# Patient Record
Sex: FEMALE | Race: WHITE | NOT HISPANIC OR LATINO | URBAN - METROPOLITAN AREA
[De-identification: names, ages, dates, MRNs, and addresses within clinical notes are randomized per-mention and may not be internally consistent; named-entity substitution may affect disease eponyms.]

---

## 2021-03-17 ENCOUNTER — OFFICE VISIT (OUTPATIENT)
Dept: URBAN - METROPOLITAN AREA CLINIC 40 | Facility: CLINIC | Age: 31
End: 2021-03-17

## 2021-03-18 ENCOUNTER — WEB ENCOUNTER (OUTPATIENT)
Dept: URBAN - METROPOLITAN AREA CLINIC 40 | Facility: CLINIC | Age: 31
End: 2021-03-18

## 2021-03-18 ENCOUNTER — OFFICE VISIT (OUTPATIENT)
Dept: URBAN - METROPOLITAN AREA CLINIC 40 | Facility: CLINIC | Age: 31
End: 2021-03-18
Payer: COMMERCIAL

## 2021-03-18 DIAGNOSIS — K52.9 CHRONIC DIARRHEA: ICD-10-CM

## 2021-03-18 DIAGNOSIS — R10.9 ABDOMINAL PAIN: ICD-10-CM

## 2021-03-18 DIAGNOSIS — D80.2 IGA DEFICIENCY: ICD-10-CM

## 2021-03-18 DIAGNOSIS — K62.5 RECTAL BLEEDING: ICD-10-CM

## 2021-03-18 PROCEDURE — 99204 OFFICE O/P NEW MOD 45 MIN: CPT | Performed by: PHYSICIAN ASSISTANT

## 2021-03-18 RX ORDER — SUMATRIPTAN SUCCINATE 100 MG/1
(PRIOR AUTH#:000002328174) TABLET ORAL
Qty: 9 DELAYED RELEASE TABLET | Status: ACTIVE | COMMUNITY

## 2021-03-18 RX ORDER — DICYCLOMINE HYDROCHLORIDE 10 MG/1
1 TABLET CAPSULE ORAL
Qty: 90 | Refills: 1 | OUTPATIENT
Start: 2021-03-18 | End: 2021-05-17

## 2021-03-18 RX ORDER — TOPIRAMATE 50 MG/1
(PRIOR AUTH#:000002328175) TABLET ORAL
Qty: 90 DELAYED RELEASE TABLET | Status: ACTIVE | COMMUNITY

## 2021-03-18 RX ORDER — SODIUM, POTASSIUM,MAG SULFATES 17.5-3.13G
ONCE SOLUTION, RECONSTITUTED, ORAL ORAL
Qty: 1 KIT | Refills: 0 | OUTPATIENT
Start: 2021-03-18 | End: 2021-03-19

## 2021-03-18 RX ORDER — UBROGEPANT 50 MG/1
(PRIOR AUTH#:000000178132) TABLET ORAL
Qty: 10 DELAYED RELEASE TABLET | Status: ACTIVE | COMMUNITY

## 2021-03-18 NOTE — HPI-TODAY'S VISIT:
Ms. Oreilly is a 31 year old White female who presents to the office today for second opinion of previously diagnosed IBS diarrhea.  Has a history of anxiety depression and migraine headaches.  Followed by neurology.  On Ubrelvy, sumatriptan.  Admits to taking Goody's powders at times for her headaches.  Has noted intermittent abdominal cramping, general abdominal pain and loose stool 2-3 times a day for years.  Recently seen by [facility] [place] and did have blood work last year, including normal thyroid function panel, CBC and CMP.  CRP normal.  Celiac serology was normal however total IgA was decreased.  Gives no family history of colon, gastric malignancy.  Does not report a family history of celiac disease.  Denies any significant weight loss.  Has Mirena IUD, denies pregnancy. Has significant stress with her job and is actually planning to quit this position in the coming weeks.  Tends to notice that the loose stool urgency of bowel movement is greatest in the morning before work.  Rare episodes of rectal bleeding of bright red blood.  Denies melena.  Trying to eat healthier and drink more water she admits.  Non-smoker.  Does not drink alcohol.  Has had no response in the past a low FODMAP diet.  Has tried to avoid dairy which also did not seem to make any significant difference in symptoms.  Fairly balanced diet.  She is overweight.  Not diabetic.  No prior endoscopy.

## 2021-03-19 ENCOUNTER — WEB ENCOUNTER (OUTPATIENT)
Dept: URBAN - METROPOLITAN AREA CLINIC 40 | Facility: CLINIC | Age: 31
End: 2021-03-19

## 2021-04-15 ENCOUNTER — ERX REFILL RESPONSE (OUTPATIENT)
Dept: URBAN - METROPOLITAN AREA CLINIC 40 | Facility: CLINIC | Age: 31
End: 2021-04-15

## 2021-04-15 RX ORDER — DICYCLOMINE HYDROCHLORIDE 10 MG/1
1 TABLET CAPSULE ORAL
Qty: 90 | Refills: 1

## 2021-04-21 ENCOUNTER — OFFICE VISIT (OUTPATIENT)
Dept: URBAN - METROPOLITAN AREA SURGERY CENTER 30 | Facility: SURGERY CENTER | Age: 31
End: 2021-04-21
Payer: COMMERCIAL

## 2021-04-21 DIAGNOSIS — K25.9 ANTRAL ULCER: ICD-10-CM

## 2021-04-21 DIAGNOSIS — K29.30 CHRONIC SUPERFICIAL GASTRITIS: ICD-10-CM

## 2021-04-21 DIAGNOSIS — R19.7 ACUTE DIARRHEA: ICD-10-CM

## 2021-04-21 DIAGNOSIS — K29.80 ACUTE DUODENITIS: ICD-10-CM

## 2021-04-21 DIAGNOSIS — K22.8 COLUMNAR-LINED ESOPHAGUS: ICD-10-CM

## 2021-04-21 PROCEDURE — G8907 PT DOC NO EVENTS ON DISCHARG: HCPCS | Performed by: INTERNAL MEDICINE

## 2021-04-21 PROCEDURE — 43239 EGD BIOPSY SINGLE/MULTIPLE: CPT | Performed by: INTERNAL MEDICINE

## 2021-04-21 PROCEDURE — 45380 COLONOSCOPY AND BIOPSY: CPT | Performed by: INTERNAL MEDICINE

## 2021-05-07 ENCOUNTER — OFFICE VISIT (OUTPATIENT)
Dept: URBAN - METROPOLITAN AREA CLINIC 40 | Facility: CLINIC | Age: 31
End: 2021-05-07

## 2021-05-07 RX ORDER — SUMATRIPTAN SUCCINATE 100 MG/1
(PRIOR AUTH#:000002328174) TABLET ORAL
Qty: 9 DELAYED RELEASE TABLET | Status: ACTIVE | COMMUNITY

## 2021-05-07 RX ORDER — TOPIRAMATE 50 MG/1
(PRIOR AUTH#:000002328175) TABLET ORAL
Qty: 90 DELAYED RELEASE TABLET | Status: ACTIVE | COMMUNITY

## 2021-05-07 RX ORDER — DICYCLOMINE HYDROCHLORIDE 10 MG/1
1 TABLET CAPSULE ORAL
Qty: 90 | Refills: 1 | Status: ACTIVE | COMMUNITY

## 2021-05-07 RX ORDER — UBROGEPANT 50 MG/1
(PRIOR AUTH#:000000178132) TABLET ORAL
Qty: 10 DELAYED RELEASE TABLET | Status: ACTIVE | COMMUNITY

## 2021-05-12 ENCOUNTER — OFFICE VISIT (OUTPATIENT)
Dept: URBAN - METROPOLITAN AREA CLINIC 40 | Facility: CLINIC | Age: 31
End: 2021-05-12

## 2021-05-26 ENCOUNTER — OFFICE VISIT (OUTPATIENT)
Dept: URBAN - METROPOLITAN AREA CLINIC 40 | Facility: CLINIC | Age: 31
End: 2021-05-26
Payer: COMMERCIAL

## 2021-05-26 VITALS
HEART RATE: 89 BPM | DIASTOLIC BLOOD PRESSURE: 80 MMHG | SYSTOLIC BLOOD PRESSURE: 120 MMHG | WEIGHT: 182 LBS | TEMPERATURE: 98.6 F | HEIGHT: 67 IN | BODY MASS INDEX: 28.56 KG/M2

## 2021-05-26 DIAGNOSIS — K62.5 RECTAL BLEEDING: ICD-10-CM

## 2021-05-26 DIAGNOSIS — K26.9 DUODENAL ULCER: ICD-10-CM

## 2021-05-26 DIAGNOSIS — K52.9 CHRONIC DIARRHEA: ICD-10-CM

## 2021-05-26 DIAGNOSIS — K29.90 GASTRITIS AND DUODENITIS: ICD-10-CM

## 2021-05-26 DIAGNOSIS — K25.3 GASTRIC ULCER: ICD-10-CM

## 2021-05-26 DIAGNOSIS — K21.9 GERD: ICD-10-CM

## 2021-05-26 DIAGNOSIS — D80.2 IGA DEFICIENCY: ICD-10-CM

## 2021-05-26 DIAGNOSIS — K64.8 INTERNAL HEMORRHOIDS: ICD-10-CM

## 2021-05-26 DIAGNOSIS — R10.9 ABDOMINAL PAIN: ICD-10-CM

## 2021-05-26 PROCEDURE — 99213 OFFICE O/P EST LOW 20 MIN: CPT | Performed by: INTERNAL MEDICINE

## 2021-05-26 RX ORDER — UBROGEPANT 50 MG/1
(PRIOR AUTH#:000000178132) TABLET ORAL
Qty: 10 DELAYED RELEASE TABLET | Status: ACTIVE | COMMUNITY

## 2021-05-26 RX ORDER — PANTOPRAZOLE SODIUM 40 MG/1
1 TABLET TABLET, DELAYED RELEASE ORAL BID
Qty: 60 TABLET | Refills: 1 | OUTPATIENT
Start: 2021-05-26

## 2021-05-26 RX ORDER — DICYCLOMINE HYDROCHLORIDE 10 MG/1
1 TABLET CAPSULE ORAL
Qty: 90 | Refills: 1 | Status: ACTIVE | COMMUNITY

## 2021-05-26 RX ORDER — SUMATRIPTAN SUCCINATE 100 MG/1
(PRIOR AUTH#:000002328174) TABLET ORAL
Qty: 9 DELAYED RELEASE TABLET | Status: ACTIVE | COMMUNITY

## 2021-05-26 RX ORDER — TOPIRAMATE 50 MG/1
(PRIOR AUTH#:000002328175) TABLET ORAL
Qty: 90 DELAYED RELEASE TABLET | Status: ACTIVE | COMMUNITY

## 2021-05-26 RX ORDER — SUCRALFATE 1 G/1
1 TABLET ON AN EMPTY STOMACH TABLET ORAL TWICE A DAY
Qty: 60 | Refills: 2 | OUTPATIENT
Start: 2021-05-26 | End: 2021-08-24

## 2021-05-26 NOTE — HPI-TODAY'S VISIT:
Ms. Oreilly is a 31 year old White female last seen 3/18/21 for second opinion of prior diagnosis of IBS. She has a history of anxiety depression and migraine headaches.  Followed by neurology in [facility].  On Ubrelvy, sumatriptan. She was previously taking Goody's powders at times for her headaches.  Has noted intermittent abdominal cramping, general abdominal pain and loose stool 2-3 times a day for years.  Recently seen by [facility] [place] and did have blood work last year, including normal thyroid function panel, CBC and CMP.  CRP normal.  Celiac serology was normal however total IgA was decreased.  Gives no family history of colon, gastric malignancy.  Does not report a family history of celiac disease.  Denies any significant weight loss.  Has Mirena IUD, denies pregnancy. Has significant stress with her job and is actually planning to quit this position in the coming weeks.  Tends to notice that the loose stool urgency of bowel movement is greatest in the morning before work.  Rare episodes of rectal bleeding of bright red blood.  Denies melena.  Trying to eat healthier and drink more water she admits.  Non-smoker.  Does not drink alcohol.  Has had no response in the past a low FODMAP diet.  Has tried to avoid dairy which also did not seem to make any significant difference in symptoms.  Fairly balanced diet.  She is overweight.  Not diabetic.  She did have LA grade C esophagitis on EGD April 21, 2021 with Dr. Harris.  One nonbleeding gastric ulcer 5 mm noted during exam.  Also diffuse gastritis.  Nonbleeding duodenal ulcer about to 5 mm in largest dimension.  Duodenitis.  Per path, evidence of chronic duodenitis without evidence of celiac disease.  Gastric biopsies with chronic gastritis and reactive changes.  No H. pylori.  Esophageal biopsies with increased eosinophils.  No prior complaint of dysphagia.  Colonoscopy  was entirely normal outside of perianal skin tags and mild, nonbleeding internal hemorrhoids.  Random biopsies obtained to rule out chronic colitis.  Per path, no evidence of active, chronic or microscopic colitis. She did not complete stool studies. Diarrhea improved. Not much rectal bleeding. Has not begun ppi therapy for PUD, gastritis and duodenitis. Taking dicyclomine as needed since bid-tid dosing causes constipation.

## 2021-06-21 ENCOUNTER — ERX REFILL RESPONSE (OUTPATIENT)
Dept: URBAN - METROPOLITAN AREA CLINIC 40 | Facility: CLINIC | Age: 31
End: 2021-06-21

## 2021-06-21 RX ORDER — PANTOPRAZOLE SODIUM 40 MG/1
1 TABLET TABLET, DELAYED RELEASE ORAL BID
Qty: 60 | Refills: 1

## 2021-06-21 RX ORDER — SUCRALFATE 1 G/1
1 TABLET ON AN EMPTY STOMACH TABLET ORAL TWICE A DAY
Qty: 60 | Refills: 2

## 2021-07-06 ENCOUNTER — ERX REFILL RESPONSE (OUTPATIENT)
Dept: URBAN - METROPOLITAN AREA CLINIC 40 | Facility: CLINIC | Age: 31
End: 2021-07-06

## 2021-07-06 RX ORDER — DICYCLOMINE HYDROCHLORIDE 10 MG/1
TAKE 1 CAPSULE BY MOUTH THREE TIMES A DAY AS NEEDED CAPSULE ORAL
Qty: 90 CAPSULE | Refills: 2 | OUTPATIENT

## 2021-07-06 RX ORDER — DICYCLOMINE HYDROCHLORIDE 10 MG/1
1 TABLET CAPSULE ORAL
Qty: 90 | Refills: 1 | OUTPATIENT

## 2021-07-13 ENCOUNTER — ERX REFILL RESPONSE (OUTPATIENT)
Dept: URBAN - METROPOLITAN AREA CLINIC 40 | Facility: CLINIC | Age: 31
End: 2021-07-13

## 2021-07-13 RX ORDER — PANTOPRAZOLE SODIUM 40 MG/1
TAKE 1 TABLET BY MOUTH TWICE A DAY TABLET, DELAYED RELEASE ORAL
Qty: 60 TABLET | Refills: 2 | OUTPATIENT

## 2021-07-13 RX ORDER — PANTOPRAZOLE SODIUM 40 MG/1
1 TABLET TABLET, DELAYED RELEASE ORAL BID
Qty: 60 | Refills: 1 | OUTPATIENT

## 2021-07-27 ENCOUNTER — ERX REFILL RESPONSE (OUTPATIENT)
Dept: URBAN - METROPOLITAN AREA CLINIC 40 | Facility: CLINIC | Age: 31
End: 2021-07-27

## 2021-07-27 RX ORDER — DICYCLOMINE HYDROCHLORIDE 10 MG/1
TAKE 1 CAPSULE BY MOUTH THREE TIMES A DAY AS NEEDED CAPSULE ORAL
Qty: 90 CAPSULE | Refills: 2 | OUTPATIENT

## 2021-07-27 RX ORDER — DICYCLOMINE HYDROCHLORIDE 10 MG/1
TAKE 1 CAPSULE BY MOUTH THREE TIMES A DAY AS NEEDED 30 CAPSULE ORAL
Qty: 270 CAPSULE | Refills: 1 | OUTPATIENT

## 2021-07-28 ENCOUNTER — OFFICE VISIT (OUTPATIENT)
Dept: URBAN - METROPOLITAN AREA CLINIC 40 | Facility: CLINIC | Age: 31
End: 2021-07-28

## 2021-07-28 RX ORDER — SUCRALFATE 1 G/1
1 TABLET ON AN EMPTY STOMACH TABLET ORAL TWICE A DAY
Qty: 60 | Refills: 2 | Status: ACTIVE | COMMUNITY

## 2021-07-28 RX ORDER — UBROGEPANT 50 MG/1
(PRIOR AUTH#:000000178132) TABLET ORAL
Qty: 10 DELAYED RELEASE TABLET | Status: ACTIVE | COMMUNITY

## 2021-07-28 RX ORDER — TOPIRAMATE 50 MG/1
(PRIOR AUTH#:000002328175) TABLET ORAL
Qty: 90 DELAYED RELEASE TABLET | Status: ACTIVE | COMMUNITY

## 2021-07-28 RX ORDER — SUMATRIPTAN SUCCINATE 100 MG/1
(PRIOR AUTH#:000002328174) TABLET ORAL
Qty: 9 DELAYED RELEASE TABLET | Status: ACTIVE | COMMUNITY

## 2021-07-28 RX ORDER — DICYCLOMINE HYDROCHLORIDE 10 MG/1
TAKE 1 CAPSULE BY MOUTH THREE TIMES A DAY AS NEEDED CAPSULE ORAL
Qty: 90 CAPSULE | Refills: 2 | Status: ACTIVE | COMMUNITY

## 2021-07-28 RX ORDER — SUCRALFATE 1 G/1
1 TABLET ON AN EMPTY STOMACH TABLET ORAL TWICE A DAY
Qty: 60 | Refills: 2 | OUTPATIENT

## 2021-07-28 RX ORDER — PANTOPRAZOLE SODIUM 40 MG/1
1 TABLET TABLET, DELAYED RELEASE ORAL BID
Qty: 60 TABLET | Refills: 1 | OUTPATIENT

## 2021-07-28 RX ORDER — PANTOPRAZOLE SODIUM 40 MG/1
TAKE 1 TABLET BY MOUTH TWICE A DAY TABLET, DELAYED RELEASE ORAL
Qty: 60 TABLET | Refills: 2 | Status: ACTIVE | COMMUNITY

## 2021-07-28 NOTE — HPI-TODAY'S VISIT:
Ms. Oreilly is a 31 year old White female last seen 3/18/21 for second opinion of prior diagnosis of IBS. She has a history of anxiety depression and migraine headaches.  Followed by neurology in [facility].  On Ubrelvy, sumatriptan. She was previously taking Goody's powders at times for her headaches.  Has noted intermittent abdominal cramping, general abdominal pain and loose stool 2-3 times a day for years.  Recently seen by [facility] [place] and did have blood work last year, including normal thyroid function panel, CBC and CMP.  CRP normal.  Celiac serology was normal however total IgA was decreased.  Gives no family history of colon, gastric malignancy.  Does not report a family history of celiac disease.  Denies any significant weight loss.  Has Mirena IUD, denies pregnancy. Has significant stress with her job and is actually planning to quit this position in the coming weeks.  Tends to notice that the loose stool urgency of bowel movement is greatest in the morning before work.  Rare episodes of rectal bleeding of bright red blood.  Denies melena.  Trying to eat healthier and drink more water she admits.  Non-smoker.  Does not drink alcohol.  Has had no response in the past a low FODMAP diet.  Has tried to avoid dairy which also did not seem to make any significant difference in symptoms.  Fairly balanced diet.  She is overweight.  Not diabetic.  She did have LA grade C esophagitis on EGD April 21, 2021 with Dr. Harris.  One nonbleeding gastric ulcer 5 mm noted during exam.  Also diffuse gastritis.  Nonbleeding duodenal ulcer about to 5 mm in largest dimension.  Duodenitis.  Per path, evidence of chronic duodenitis without evidence of celiac disease.  Gastric biopsies with chronic gastritis and reactive changes.  No H. pylori.  Esophageal biopsies with increased eosinophils.  No prior complaint of dysphagia.  Colonoscopy  was entirely normal outside of perianal skin tags and mild, nonbleeding internal hemorrhoids.  Random biopsies obtained to rule out chronic colitis.  Per path, no evidence of active, chronic or microscopic colitis. She did not complete stool studies. Diarrhea improved. Not much rectal bleeding. Has not begun ppi therapy for PUD, gastritis and duodenitis. Taking dicyclomine as needed since bid-tid dosing causes constipation.

## 2021-08-16 ENCOUNTER — ERX REFILL RESPONSE (OUTPATIENT)
Dept: URBAN - METROPOLITAN AREA CLINIC 40 | Facility: CLINIC | Age: 31
End: 2021-08-16

## 2021-08-16 RX ORDER — PANTOPRAZOLE SODIUM 40 MG/1
TAKE 1 TABLET BY MOUTH TWICE A DAY TABLET, DELAYED RELEASE ORAL
Qty: 60 TABLET | Refills: 2 | OUTPATIENT

## 2021-08-16 RX ORDER — PANTOPRAZOLE SODIUM 40 MG/1
1 TABLET TABLET, DELAYED RELEASE ORAL BID
Qty: 60 TABLET | Refills: 1 | OUTPATIENT

## 2021-09-13 ENCOUNTER — ERX REFILL RESPONSE (OUTPATIENT)
Dept: URBAN - METROPOLITAN AREA CLINIC 40 | Facility: CLINIC | Age: 31
End: 2021-09-13

## 2021-09-13 RX ORDER — PANTOPRAZOLE SODIUM 40 MG/1
TAKE 1 TABLET BY MOUTH TWICE A DAY TABLET, DELAYED RELEASE ORAL
Qty: 60 TABLET | Refills: 2 | OUTPATIENT

## 2021-09-13 RX ORDER — SUCRALFATE 1 G/1
1 TABLET ON AN EMPTY STOMACH TABLET ORAL TWICE A DAY
Qty: 60 | Refills: 2 | OUTPATIENT

## 2021-09-13 RX ORDER — PANTOPRAZOLE SODIUM 40 MG/1
TAKE 1 TABLET BY MOUTH TWICE A DAY 30 TABLET, DELAYED RELEASE ORAL
Qty: 60 TABLET | Refills: 2 | OUTPATIENT

## 2021-09-13 RX ORDER — SUCRALFATE 1 G/1
1 TABLET ON AN EMPTY STOMACH TWICE A DAY ORALLY 30 TABLET ORAL
Qty: 180 TABLET | Refills: 1 | OUTPATIENT

## 2021-10-07 ENCOUNTER — ERX REFILL RESPONSE (OUTPATIENT)
Dept: URBAN - METROPOLITAN AREA CLINIC 40 | Facility: CLINIC | Age: 31
End: 2021-10-07

## 2021-10-07 RX ORDER — PANTOPRAZOLE SODIUM 40 MG/1
TAKE 1 TABLET BY MOUTH TWICE A DAY 30 TABLET, DELAYED RELEASE ORAL
Qty: 60 TABLET | Refills: 2 | OUTPATIENT

## 2021-11-01 ENCOUNTER — ERX REFILL RESPONSE (OUTPATIENT)
Dept: URBAN - METROPOLITAN AREA CLINIC 40 | Facility: CLINIC | Age: 31
End: 2021-11-01

## 2021-11-01 RX ORDER — PANTOPRAZOLE SODIUM 40 MG/1
TAKE 1 TABLET BY MOUTH TWICE A DAY TABLET, DELAYED RELEASE ORAL
Qty: 60 TABLET | Refills: 2 | OUTPATIENT

## 2021-11-01 RX ORDER — PANTOPRAZOLE SODIUM 40 MG/1
TAKE 1 TABLET BY MOUTH TWICE A DAY 30 TABLET, DELAYED RELEASE ORAL
Qty: 60 TABLET | Refills: 2 | OUTPATIENT

## 2021-11-05 ENCOUNTER — DASHBOARD ENCOUNTERS (OUTPATIENT)
Age: 31
End: 2021-11-05

## 2021-11-08 ENCOUNTER — OFFICE VISIT (OUTPATIENT)
Dept: URBAN - METROPOLITAN AREA TELEHEALTH 2 | Facility: TELEHEALTH | Age: 31
End: 2021-11-08
Payer: COMMERCIAL

## 2021-11-08 DIAGNOSIS — R19.7 DIARRHEA: ICD-10-CM

## 2021-11-08 DIAGNOSIS — Z87.19 HISTORY OF IBS: ICD-10-CM

## 2021-11-08 DIAGNOSIS — R10.84 ABDOMINAL PAIN, GENERALIZED: ICD-10-CM

## 2021-11-08 DIAGNOSIS — K64.8 INTERNAL HEMORRHOIDS: ICD-10-CM

## 2021-11-08 DIAGNOSIS — K29.90 GASTRITIS AND DUODENITIS: ICD-10-CM

## 2021-11-08 DIAGNOSIS — D80.2 IGA DEFICIENCY: ICD-10-CM

## 2021-11-08 DIAGNOSIS — K21.9 GERD: ICD-10-CM

## 2021-11-08 DIAGNOSIS — K25.3 GASTRIC ULCER: ICD-10-CM

## 2021-11-08 DIAGNOSIS — K62.5 RECTAL BLEEDING: ICD-10-CM

## 2021-11-08 DIAGNOSIS — K26.9 DUODENAL ULCER: ICD-10-CM

## 2021-11-08 PROBLEM — 235595009 GASTROESOPHAGEAL REFLUX DISEASE: Status: ACTIVE | Noted: 2021-05-26

## 2021-11-08 PROBLEM — 196731005: Status: ACTIVE | Noted: 2021-05-26

## 2021-11-08 PROBLEM — 51868009 DUODENAL ULCER: Status: ACTIVE | Noted: 2021-05-26

## 2021-11-08 PROBLEM — 29260007: Status: ACTIVE | Noted: 2021-03-18

## 2021-11-08 PROCEDURE — 99213 OFFICE O/P EST LOW 20 MIN: CPT | Performed by: INTERNAL MEDICINE

## 2021-11-08 RX ORDER — PANTOPRAZOLE SODIUM 40 MG/1
TAKE 1 TABLET BY MOUTH TWICE A DAY TABLET, DELAYED RELEASE ORAL
Qty: 60 TABLET | Refills: 2 | Status: ACTIVE | COMMUNITY

## 2021-11-08 RX ORDER — SUCRALFATE 1 G/1
1 TABLET ON AN EMPTY STOMACH TWICE A DAY ORALLY 30 TABLET ORAL
OUTPATIENT

## 2021-11-08 RX ORDER — DICYCLOMINE HYDROCHLORIDE 10 MG/1
TAKE 1 CAPSULE BY MOUTH THREE TIMES A DAY AS NEEDED 30 CAPSULE ORAL
Qty: 270 CAPSULE | Refills: 1 | Status: DISCONTINUED | COMMUNITY

## 2021-11-08 RX ORDER — UBROGEPANT 50 MG/1
(PRIOR AUTH#:000000178132) TABLET ORAL
Qty: 10 DELAYED RELEASE TABLET | Status: DISCONTINUED | COMMUNITY

## 2021-11-08 RX ORDER — PANTOPRAZOLE SODIUM 40 MG/1
1 TABLET TABLET, DELAYED RELEASE ORAL BID
Qty: 60 TABLET | Refills: 1 | OUTPATIENT

## 2021-11-08 RX ORDER — DICYCLOMINE HYDROCHLORIDE 10 MG/1
TAKE 1 CAPSULE BY MOUTH THREE TIMES A DAY AS NEEDED CAPSULE ORAL
Qty: 90 CAPSULE | Refills: 2 | Status: DISCONTINUED | COMMUNITY

## 2021-11-08 RX ORDER — TOPIRAMATE 50 MG/1
(PRIOR AUTH#:000002328175) TABLET ORAL
Qty: 90 DELAYED RELEASE TABLET | Status: ACTIVE | COMMUNITY

## 2021-11-08 RX ORDER — SUMATRIPTAN SUCCINATE 100 MG/1
(PRIOR AUTH#:000002328174) TABLET ORAL
Qty: 9 DELAYED RELEASE TABLET | Status: DISCONTINUED | COMMUNITY

## 2021-11-08 RX ORDER — SUCRALFATE 1 G/1
1 TABLET ON AN EMPTY STOMACH TWICE A DAY ORALLY 30 TABLET ORAL
Qty: 180 TABLET | Refills: 1 | Status: ACTIVE | COMMUNITY

## 2021-11-08 RX ORDER — HYOSCYAMINE SULFATE 0.12 MG/1
1 TABLET AS NEEDED TABLET ORAL THREE TIMES A DAY
Qty: 90 | Refills: 1 | OUTPATIENT
Start: 2021-11-08

## 2021-11-08 RX ORDER — RIMEGEPANT SULFATE 75 MG/75MG
1 TABLET ON THE TONGUE AND ALLOW TO DISSOLVE TABLET, ORALLY DISINTEGRATING ORAL EVERY OTHER DAY
Status: ACTIVE | COMMUNITY

## 2021-11-08 NOTE — HPI-TODAY'S VISIT:
Ms. Oreilly is a 31 year old White female last seen 5/26/21 for second opinion of prior diagnosis of IBS. She has a history of anxiety depression and migraine headaches.  Followed by neurology in [facility].  On Ubrelvy, sumatriptan. She was previously taking Goody's powders at times for her headaches.  Has noted intermittent abdominal cramping, general abdominal pain and loose stool 2-3 times a day for years.  Recently seen by [facility] [place] and did have blood work last year, including normal thyroid function panel, CBC and CMP.  CRP normal.  Celiac serology was normal however total IgA was decreased.  Gives no family history of colon, gastric malignancy.  Does not report a family history of celiac disease.  Denies any significant weight loss.  Has Mirena IUD, denies pregnancy. Has significant stress with her job and is actually planning to quit this position in the coming weeks.  Tends to notice that the loose stool urgency of bowel movement is greatest in the morning before work.  Rare episodes of rectal bleeding of bright red blood.  Denies melena.  Trying to eat healthier and drink more water she admits.  Non-smoker.  Does not drink alcohol.  Has had no response in the past a low FODMAP diet.  Has tried to avoid dairy which also did not seem to make any significant difference in symptoms.  Fairly balanced diet.  She is overweight.  Not diabetic.  She did have LA grade C esophagitis on EGD April 21, 2021 with Dr. Harris.  One nonbleeding gastric ulcer 5 mm noted during exam.  Also diffuse gastritis.  Nonbleeding duodenal ulcer about to 5 mm in largest dimension.  Duodenitis.  Per path, evidence of chronic duodenitis without evidence of celiac disease.  Gastric biopsies with chronic gastritis and reactive changes.  No H. pylori.  Esophageal biopsies with increased eosinophils.  No prior complaint of dysphagia.  Colonoscopy  was entirely normal outside of perianal skin tags and mild, nonbleeding internal hemorrhoids.  Random biopsies obtained to rule out chronic colitis.  Per path, no evidence of active, chronic or microscopic colitis. She did not complete stool studies. Diarrhea improved. Not much rectal bleeding. Has not begun ppi therapy for PUD, gastritis and duodenitis. Taking dicyclomine as needed since bid-tid dosing causes constipation. 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 Due to IBS symptoms and headaches, she had to leave her job. Has stopped using Goody's. Taking the pantoprazole and sucralfate with persisent intermittent cramps, alternating stool consistency without rectal bleeding or melena. Good appetite, but may skip dinner some days as she is unsure what may trigger her colonic spasms.

## 2021-11-11 ENCOUNTER — TELEPHONE ENCOUNTER (OUTPATIENT)
Dept: URBAN - METROPOLITAN AREA CLINIC 74 | Facility: CLINIC | Age: 31
End: 2021-11-11

## 2021-12-06 ENCOUNTER — ERX REFILL RESPONSE (OUTPATIENT)
Dept: URBAN - METROPOLITAN AREA CLINIC 40 | Facility: CLINIC | Age: 31
End: 2021-12-06

## 2021-12-06 RX ORDER — SUCRALFATE 1 G/1
1 TABLET ON AN EMPTY STOMACH TWICE A DAY ORALLY 30 90 TABLET ORAL
Qty: 180 TABLET | Refills: 0 | OUTPATIENT

## 2021-12-06 RX ORDER — SUCRALFATE 1 G/1
1 TABLET ON AN EMPTY STOMACH TWICE A DAY ORALLY 30 90 TABLET ORAL
Qty: 180 TABLET | Refills: 1 | OUTPATIENT

## 2021-12-06 RX ORDER — HYOSCYAMINE SULFATE 0.12 MG/1
TAKE 1 TABLET BY MOUTH 3 TIMES A DAY AS NEEDED TABLET ORAL
Qty: 90 TABLET | Refills: 4 | OUTPATIENT

## 2021-12-06 RX ORDER — PANTOPRAZOLE SODIUM 40 MG/1
TAKE 1 TABLET BY MOUTH TWICE A DAY TABLET, DELAYED RELEASE ORAL
Qty: 60 TABLET | Refills: 2 | OUTPATIENT

## 2022-01-18 ENCOUNTER — ERX REFILL RESPONSE (OUTPATIENT)
Dept: URBAN - METROPOLITAN AREA CLINIC 40 | Facility: CLINIC | Age: 32
End: 2022-01-18

## 2022-01-18 RX ORDER — PANTOPRAZOLE SODIUM 40 MG/1
TAKE 1 TABLET BY MOUTH TWICE A DAY TABLET, DELAYED RELEASE ORAL
Qty: 180 TABLET | Refills: 1 | OUTPATIENT